# Patient Record
Sex: FEMALE | Race: WHITE
[De-identification: names, ages, dates, MRNs, and addresses within clinical notes are randomized per-mention and may not be internally consistent; named-entity substitution may affect disease eponyms.]

---

## 2018-05-24 ENCOUNTER — HOSPITAL ENCOUNTER (OUTPATIENT)
Dept: HOSPITAL 80 - FCP | Age: 58
End: 2018-05-24
Attending: FAMILY MEDICINE
Payer: COMMERCIAL

## 2018-05-24 DIAGNOSIS — Z00.01: Primary | ICD-10-CM

## 2018-05-24 NOTE — CPEKG
Heart Rate: 71

RR Interval: 845

P-R Interval: 188

QRSD Interval: 94

QT Interval: 384

QTC Interval: 418

P Axis: 44

QRS Axis: 61

T Wave Axis: 19

EKG Severity - NORMAL ECG -

EKG Impression: SINUS RHYTHM

Electronically Signed By: Gilberto Dodd 26-May-2018 07:00:51